# Patient Record
Sex: MALE
[De-identification: names, ages, dates, MRNs, and addresses within clinical notes are randomized per-mention and may not be internally consistent; named-entity substitution may affect disease eponyms.]

---

## 2017-03-07 ENCOUNTER — APPOINTMENT (OUTPATIENT)
Dept: ENDOCRINOLOGY | Facility: CLINIC | Age: 67
End: 2017-03-07

## 2024-05-21 ENCOUNTER — APPOINTMENT (OUTPATIENT)
Dept: ENDOCRINOLOGY | Facility: CLINIC | Age: 74
End: 2024-05-21

## 2024-05-27 NOTE — HISTORY OF PRESENT ILLNESS
[FreeTextEntry1] : 74 year old M pt, with Hx of T2DM diagnosed in ___ , referred by Dr. jane, presents today to establish endocrine care.   Patient with no information of DM related complications.  FHx of DM: Last Funduscopic visit:  DM retinopathy: Y/N   Other PMHx:  No PMHx of:  FHx: Mother: Father: Sibling:  No family Hx of:  Social Hx: Non smoker. No EtOH use. No recreational drug use. Works . Lives with .  Lifestyle:  NKDA  05/21/2024 Patient presents today for   Patient has POCT BS , BP and BMI , with the chief complaint of  He endorses    Current Medications:

## 2024-05-27 NOTE — ADDENDUM
[FreeTextEntry1] : MARTHA, Kimberly Cronin, am scribing for an in the presence of  Dr. Carlton Dawson on this date in the following sections HISTORY OF PRESENT ILLNESS, PAST MEDICAL/FAMILY/SOCIAL HISTORY; REVIEW OF SYSTEMS; VITAL SIGNS; PHYSICAL EXAM; ASSESSMENT/PLAN.

## 2024-05-27 NOTE — END OF VISIT
[FreeTextEntry3] : I personally performed the services described in the documentation, reviewed the documentation recorded by the scribe in my presence, and it accurately and completely records my words and actions.